# Patient Record
Sex: FEMALE | Race: WHITE | Employment: OTHER | URBAN - METROPOLITAN AREA
[De-identification: names, ages, dates, MRNs, and addresses within clinical notes are randomized per-mention and may not be internally consistent; named-entity substitution may affect disease eponyms.]

---

## 2021-03-14 ENCOUNTER — HOSPITAL ENCOUNTER (EMERGENCY)
Age: 26
Discharge: HOME | End: 2021-03-14
Attending: STUDENT IN AN ORGANIZED HEALTH CARE EDUCATION/TRAINING PROGRAM
Payer: COMMERCIAL

## 2021-03-14 VITALS
WEIGHT: 135 LBS | BODY MASS INDEX: 20.46 KG/M2 | DIASTOLIC BLOOD PRESSURE: 80 MMHG | OXYGEN SATURATION: 99 % | HEIGHT: 68 IN | TEMPERATURE: 98.1 F | RESPIRATION RATE: 19 BRPM | HEART RATE: 68 BPM | SYSTOLIC BLOOD PRESSURE: 128 MMHG
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
## 2021-03-14 DIAGNOSIS — S40.021A ARM CONTUSION, RIGHT, INITIAL ENCOUNTER: ICD-10-CM

## 2021-03-14 PROCEDURE — 99283 EMERGENCY DEPT VISIT LOW MDM: CPT

## 2021-03-14 PROCEDURE — 74011250637 HC RX REV CODE- 250/637: Performed by: STUDENT IN AN ORGANIZED HEALTH CARE EDUCATION/TRAINING PROGRAM

## 2021-03-14 RX ORDER — CYCLOBENZAPRINE HCL 10 MG
10 TABLET ORAL
Qty: 20 TAB | Refills: 0 | OUTPATIENT
Start: 2021-03-14 | End: 2022-04-25

## 2021-03-14 RX ORDER — CYCLOBENZAPRINE HCL 10 MG
10 TABLET ORAL
Qty: 12 TAB | Refills: 0 | Status: SHIPPED | OUTPATIENT
Start: 2021-03-14 | End: 2022-04-25

## 2021-03-14 RX ORDER — CYCLOBENZAPRINE HCL 10 MG
10 TABLET ORAL
Status: COMPLETED | OUTPATIENT
Start: 2021-03-14 | End: 2021-03-14

## 2021-03-14 RX ADMIN — CYCLOBENZAPRINE 10 MG: 10 TABLET, FILM COATED ORAL at 01:21
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
## 2021-03-14 NOTE — ED NOTES
Pt alert and oriented x 4. gcs 15. Discharge instructions given and reviewed by writer. Pt verbalized understanding. Pt ambulated out of ED with steady gait. No concerns voiced. No signs of acute distress noted.

## 2021-12-27 ENCOUNTER — HOSPITAL ENCOUNTER (OUTPATIENT)
Dept: GENERAL RADIOLOGY | Age: 26
Discharge: HOME | End: 2021-12-27
Payer: COMMERCIAL

## 2021-12-27 ENCOUNTER — TRANSCRIBE ORDER (OUTPATIENT)
Dept: REGISTRATION | Age: 26
End: 2021-12-27

## 2021-12-27 DIAGNOSIS — R05.9 COUGH: Primary | ICD-10-CM

## 2021-12-27 DIAGNOSIS — R05.9 COUGH: ICD-10-CM

## 2021-12-27 PROCEDURE — 71046 X-RAY EXAM CHEST 2 VIEWS: CPT

## 2022-04-25 ENCOUNTER — OFFICE VISIT (OUTPATIENT)
Dept: ENDOCRINOLOGY | Age: 27
End: 2022-04-25
Payer: COMMERCIAL

## 2022-04-25 VITALS
SYSTOLIC BLOOD PRESSURE: 114 MMHG | BODY MASS INDEX: 20.4 KG/M2 | DIASTOLIC BLOOD PRESSURE: 67 MMHG | HEART RATE: 82 BPM | WEIGHT: 134.6 LBS | HEIGHT: 68 IN

## 2022-04-25 DIAGNOSIS — E03.9 ACQUIRED HYPOTHYROIDISM: Primary | ICD-10-CM

## 2022-04-25 PROCEDURE — 99204 OFFICE O/P NEW MOD 45 MIN: CPT | Performed by: INTERNAL MEDICINE

## 2022-04-25 RX ORDER — LEVOTHYROXINE SODIUM 50 UG/1
TABLET ORAL
COMMUNITY
End: 2022-04-25 | Stop reason: DRUGHIGH

## 2022-04-25 RX ORDER — LEVOTHYROXINE SODIUM 75 UG/1
75 TABLET ORAL
Qty: 90 TABLET | Refills: 3 | Status: SHIPPED | OUTPATIENT
Start: 2022-04-25

## 2022-04-25 RX ORDER — CETIRIZINE HYDROCHLORIDE 10 MG/1
CAPSULE, LIQUID FILLED ORAL
COMMUNITY

## 2022-04-25 NOTE — PATIENT INSTRUCTIONS
1) Sign up for [employer] using the text link I sent to you and download the [employer] charlene from the charlene store ([employer]) or Google play store (android) (make sure you allow locations and choose the [facility] portal and choose to receive notifications) so you can communicate with me through the patient portal.  I will send you a message with your lab results. 2) Rather than draw labs today, we will change your dose of levothyroxine to 75 mcg and take 1 tab at least 30 min before food and coffee. 3) If you ever do miss a dose of levothyroxine, it's okay to take 2 pills the next day to catch up on your dose. The goal is always to get 7 pills per week and even if you have to double up several days in a row to make up for missed doses, this is better than letting your weekly level fall. 4) I will check you for Hashimoto's with your labs in 6 weeks with special antibody levels.

## 2022-04-25 NOTE — PROGRESS NOTES
Chief Complaint   Patient presents with    Thyroid Problem     pcp and pharmacy confirmed     History of Present Illness: Laura Winn is a 27 y.o. female who is a new patient for thyroid. She made this appointment herself. Was diagnosed with hypothyroidism at age 8 and has always been on treatment and managed by her pediatrician and then general practitioner aside from one visit to an endocrinologist after diagnosis. Has always been on generic levothyroxine and has never tried brand and has never been on more than 50 mcg daily. Takes the levothyroxine first thing in the morning with just water and waits at least 30 minutes before eating but sometimes can have coffee within 30 min. Randomly may take a mvi but usually it's 4 hours later. In the past 6 weeks did have about 2 weeks in total that she did not take her pills due to running out of the refill. Did notice some increase in fatigue off medication. Tends to stay cold frequently. Her feet and cold can fall asleep easily. No hair loss or brittle nails. Periods are mostly regular. Bowels are regular. Weight had been down to 125 lbs a few months ago. Currently fasting during Ramadan. Has noticed her psoriasis is worse. Can sometimes notice more pressure in her throat and like a clicking sensation especially if she misses doses. Past Medical History:   Diagnosis Date    Hypothyroidism     Psoriasis     Seasonal allergic rhinitis      History reviewed. No pertinent surgical history. Current Outpatient Medications   Medication Sig    levothyroxine (SYNTHROID) 50 mcg tablet Take  by mouth Daily (before breakfast).  Cetirizine (ZyrTEC) 10 mg cap Take  by mouth daily as needed. No current facility-administered medications for this visit.      Allergies   Allergen Reactions    Sulfa (Sulfonamide Antibiotics) Unknown (comments)     Family History   Problem Relation Age of Onset    Thyroid Disease Mother         s/p total thyroidectomy for Hashimoto's and Grave's    Diabetes Father      Social History     Socioeconomic History    Marital status: SINGLE     Spouse name: Not on file    Number of children: Not on file    Years of education: Not on file    Highest education level: Not on file   Occupational History    Not on file   Tobacco Use    Smoking status: Never Smoker    Smokeless tobacco: Never Used   Substance and Sexual Activity    Alcohol use: Never    Drug use: Never    Sexual activity: Not on file   Other Topics Concern    Not on file   Social History Narrative    Lives in the [facility] [address] alone but will be moving downtown with some roommates. [employer]ach[employer] in [place] [facility]. Enjoys [employer]. Social Determinants of Health     Financial Resource Strain:     Difficulty of Paying Living Expenses: Not on file   Food Insecurity:     Worried About Running Out of Food in the Last Year: Not on file    Erwin of Food in the Last Year: Not on file   Transportation Needs:     Lack of Transportation (Medical): Not on file    Lack of Transportation (Non-Medical):  Not on file   Physical Activity:     Days of Exercise per Week: Not on file    Minutes of Exercise per Session: Not on file   Stress:     Feeling of Stress : Not on file   Social Connections:     Frequency of Communication with Friends and Family: Not on file    Frequency of Social Gatherings with Friends and Family: Not on file    Attends Synagogue Services: Not on file    Active Member of Clubs or Organizations: Not on file    Attends Club or Organization Meetings: Not on file    Marital Status: Not on file   Intimate Partner Violence:     Fear of Current or Ex-Partner: Not on file    Emotionally Abused: Not on file    Physically Abused: Not on file    Sexually Abused: Not on file   Housing Stability:     Unable to Pay for Housing in the Last Year: Not on file    Number of [place] in the Last Year: Not on file    Unstable Housing in the Last Year: Not on file     Review of Systems: per HPI    Physical Examination:  Blood pressure 114/67, pulse 82, height 5' 8\" (1.727 m), weight 134 lb 9.6 oz (61.1 kg). - General: pleasant, no distress, good eye contact  - HEENT: no exopthalmos, no periorbital edema, no scleral/conjunctival injection, EOMI, no lid lag or stare  - Neck: supple, small goiter, no masses, lymph nodes, or carotid bruits, no supraclavicular or dorsocervical fat pads  - Cardiovascular: regular, normal rate, normal S1 and S2, no murmurs/rubs/gallops   - Respiratory: clear to auscultation bilaterally  - Gastrointestinal: soft, nontender, nondistended, no masses, no hepatosplenomegaly  - Musculoskeletal: no proximal muscle weakness in upper or lower extremities  - Integumentary: no acanthosis nigricans, no rashes, no edema  - Neurological: reflexes 2+ at biceps, no tremor  - Psychiatric: normal mood and affect    Data Reviewed:   - none available for review    Assessment/Plan:   1. Acquired hypothyroidism: Was diagnosed with hypothyroidism at age 8 and has always been on treatment and managed by her pediatrician and then general practitioner aside from one visit to an endocrinologist after diagnosis. Has always been on generic levothyroxine and has never tried brand and has never been on more than 50 mcg daily. Takes the levothyroxine first thing in the morning with just water and waits at least 30 minutes before eating but sometimes can have coffee within 30 min. Randomly may take a mvi but usually it's 4 hours later. In the past 6 weeks did have about 2 weeks in total that she did not take her pills due to running out of the refill. Did notice some increase in fatigue off medication. Given her current weight of 61 kg, I will increase her dose to 1.2 mcg/kg/d = 75 mcg daily for the next 6 weeks and will screen for Hashimoto's at that time.   Will have her wait 30 min before coffee to ensure proper absorption.  - increase levothyroxine to 75 mcg daily  - check TSH, free T4 and Thyroid antibody panel in 6 weeks  - check TSH and free T4 prior to next visit       Patient Instructions   1) Sign up for [employer] using the text link I sent to you and download the [employer] charlene from the charlene store ([employer]) or Google play store (android) (make sure you allow locations and choose the [facility] [employer] portal and choose to receive notifications) so you can communicate with me through the patient portal.  I will send you a message with your lab results. 2) Rather than draw labs today, we will change your dose of levothyroxine to 75 mcg and take 1 tab at least 30 min before food and coffee. 3) If you ever do miss a dose of levothyroxine, it's okay to take 2 pills the next day to catch up on your dose. The goal is always to get 7 pills per week and even if you have to double up several days in a row to make up for missed doses, this is better than letting your weekly level fall. 4) I will check you for Hashimoto's with your labs in 6 weeks with special antibody levels. Follow-up and Dispositions    · Return in about 6 months (around 10/25/2022).              Copy sent to:  Madhavi Nunez MD

## 2022-06-06 DIAGNOSIS — E03.9 ACQUIRED HYPOTHYROIDISM: ICD-10-CM

## 2022-10-10 DIAGNOSIS — E03.9 ACQUIRED HYPOTHYROIDISM: ICD-10-CM

## 2023-08-23 NOTE — TELEPHONE ENCOUNTER
Can you call and find out if she plans to come back and see me as I only saw her one time in 4/22 and she was a no-show for her visit in 10/22? If she plans to come back, I will refill her levothyroxine but if not, she can get refills from her current provider. If she does want to return, please go and have your labs drawn at your earliest convenience using the order I will put on file under my name at [address] [place] [address].  Once the results are back, I will offer you a visit in the 1-2 weeks after they return when I have a cancellation.

## 2023-08-25 RX ORDER — LEVOTHYROXINE SODIUM 0.07 MG/1
TABLET ORAL
Qty: 90 TABLET | Refills: 2 | OUTPATIENT
Start: 2023-08-25

## 2023-08-25 NOTE — TELEPHONE ENCOUNTER
Pt states she is seeing her PCP. Asked pt to contact PCP's office for the refill.  Pt verbalized understanding